# Patient Record
Sex: MALE | Race: WHITE | NOT HISPANIC OR LATINO | URBAN - METROPOLITAN AREA
[De-identification: names, ages, dates, MRNs, and addresses within clinical notes are randomized per-mention and may not be internally consistent; named-entity substitution may affect disease eponyms.]

---

## 2024-03-21 ENCOUNTER — TELEPHONE (OUTPATIENT)
Age: 64
End: 2024-03-21

## 2024-03-21 NOTE — TELEPHONE ENCOUNTER
New Patient    What is the reason for the patient’s appointment?: ed f/u-cystitis, bladder wall thickening, urinary frequency    What office location does the patient prefer?: [facility]    Does patient have Imaging/Lab Results: CT and labs 1/29/24 with [facility] ED     Have patient records been requested?:  If No, are the records showing in Epic:  records in epic       INSURANCE:   Do we accept the patient's insurance or is the patient Self-Pay?:    Insurance Provider: [employer]  Plan Type/Number:   Member ID#: [id number]       HISTORY:   Has the patient had any previous Urologist(s)?: no    Was the patient seen in the ED?: [facility] 1/29/24    Has the patient had any outside testing done?: yes     Does the patient have a personal history of cancer?: no     Pt call [phone]